# Patient Record
Sex: FEMALE
[De-identification: names, ages, dates, MRNs, and addresses within clinical notes are randomized per-mention and may not be internally consistent; named-entity substitution may affect disease eponyms.]

---

## 2018-04-12 ENCOUNTER — HOSPITAL ENCOUNTER (OUTPATIENT)
Dept: HOSPITAL 92 - RAD | Age: 23
Discharge: HOME | End: 2018-04-12
Payer: SELF-PAY

## 2018-04-12 DIAGNOSIS — M54.41: Primary | ICD-10-CM

## 2018-04-12 PROCEDURE — 72100 X-RAY EXAM L-S SPINE 2/3 VWS: CPT

## 2018-04-12 NOTE — RAD
LUMBAR SPINE 3 VIEWS:

 

Date:  04/12/18 

 

HISTORY:  

Lumbago with sciatica, right side. 

 

FINDINGS/IMPRESSION: 

No fracature, subluxation, or bony destruction is seen. 

 

POS: [facility]